# Patient Record
Sex: FEMALE
[De-identification: names, ages, dates, MRNs, and addresses within clinical notes are randomized per-mention and may not be internally consistent; named-entity substitution may affect disease eponyms.]

---

## 2021-11-15 ENCOUNTER — APPOINTMENT (OUTPATIENT)
Dept: AFTER HOURS CARE | Facility: EMERGENCY ROOM | Age: 41
End: 2021-11-15
Payer: MEDICAID

## 2021-11-15 DIAGNOSIS — T50.901A POISONING BY UNSPECIFIED DRUGS, MEDICAMENTS AND BIOLOGICAL SUBSTANCES, ACCIDENTAL (UNINTENTIONAL), INITIAL ENCOUNTER: ICD-10-CM

## 2021-11-15 PROBLEM — Z00.00 ENCOUNTER FOR PREVENTIVE HEALTH EXAMINATION: Status: ACTIVE | Noted: 2021-11-15

## 2021-11-15 PROCEDURE — 99203 OFFICE O/P NEW LOW 30 MIN: CPT | Mod: 95

## 2021-11-15 NOTE — PLAN
[No new medications perscribed] : Treat in place: No new medications prescribed [FreeTextEntry1] : reassurance\par PMD follow up

## 2021-11-15 NOTE — HISTORY OF PRESENT ILLNESS
[Home] : at home, [unfilled] , at the time of the visit. [Other Location: e.g. Home (Enter Location, City,State)___] : at [unfilled] [Verbal consent obtained from patient] : the patient, [unfilled] [FreeTextEntry8] : 41F hx anxiety, asthma accidentally took one extra 75mg tablet or sertraline before bed even though she already took her usual daily dose of 75mg sertraline in the afternoon. No co-ingestions, alcohol, IV drugs. Pt worried that she overdosed but otherwise does not report any symptoms at all. Pt also takes 0.25mg po clonazapam on most nights but did not take any today.

## 2021-11-15 NOTE — ASSESSMENT
[FreeTextEntry1] : 150mg sertraline as a one time dose with no co-ingestions, in the setting of a reassuring exam (no toxidrome) is not a toxic dose of medication.